# Patient Record
Sex: FEMALE | Race: BLACK OR AFRICAN AMERICAN | NOT HISPANIC OR LATINO | URBAN - METROPOLITAN AREA
[De-identification: names, ages, dates, MRNs, and addresses within clinical notes are randomized per-mention and may not be internally consistent; named-entity substitution may affect disease eponyms.]

---

## 2021-07-07 ENCOUNTER — EMERGENCY (EMERGENCY)
Facility: HOSPITAL | Age: 51
LOS: 1 days | Discharge: HOME | End: 2021-07-07
Admitting: EMERGENCY MEDICINE
Payer: MEDICAID

## 2021-07-07 VITALS
HEIGHT: 63 IN | DIASTOLIC BLOOD PRESSURE: 78 MMHG | TEMPERATURE: 98 F | OXYGEN SATURATION: 98 % | RESPIRATION RATE: 17 BRPM | SYSTOLIC BLOOD PRESSURE: 112 MMHG | WEIGHT: 169.98 LBS | HEART RATE: 60 BPM

## 2021-07-07 VITALS
DIASTOLIC BLOOD PRESSURE: 91 MMHG | OXYGEN SATURATION: 94 % | RESPIRATION RATE: 18 BRPM | TEMPERATURE: 98 F | HEART RATE: 65 BPM | SYSTOLIC BLOOD PRESSURE: 135 MMHG

## 2021-07-07 DIAGNOSIS — M62.830 MUSCLE SPASM OF BACK: ICD-10-CM

## 2021-07-07 DIAGNOSIS — K21.9 GASTRO-ESOPHAGEAL REFLUX DISEASE WITHOUT ESOPHAGITIS: ICD-10-CM

## 2021-07-07 DIAGNOSIS — I10 ESSENTIAL (PRIMARY) HYPERTENSION: ICD-10-CM

## 2021-07-07 DIAGNOSIS — M54.9 DORSALGIA, UNSPECIFIED: ICD-10-CM

## 2021-07-07 PROCEDURE — 99284 EMERGENCY DEPT VISIT MOD MDM: CPT

## 2021-07-07 RX ORDER — METHOCARBAMOL 500 MG/1
1500 TABLET, FILM COATED ORAL ONCE
Refills: 0 | Status: COMPLETED | OUTPATIENT
Start: 2021-07-07 | End: 2021-07-07

## 2021-07-07 RX ORDER — DOCUSATE SODIUM 100 MG
1 CAPSULE ORAL
Qty: 14 | Refills: 0
Start: 2021-07-07 | End: 2021-07-13

## 2021-07-07 RX ORDER — LIDOCAINE 4 G/100G
1 CREAM TOPICAL ONCE
Refills: 0 | Status: COMPLETED | OUTPATIENT
Start: 2021-07-07 | End: 2021-07-07

## 2021-07-07 RX ORDER — TRAMADOL HYDROCHLORIDE 50 MG/1
50 TABLET ORAL ONCE
Refills: 0 | Status: DISCONTINUED | OUTPATIENT
Start: 2021-07-07 | End: 2021-07-07

## 2021-07-07 RX ORDER — ACETAMINOPHEN 500 MG
650 TABLET ORAL ONCE
Refills: 0 | Status: COMPLETED | OUTPATIENT
Start: 2021-07-07 | End: 2021-07-07

## 2021-07-07 RX ORDER — DIAZEPAM 5 MG
5 TABLET ORAL ONCE
Refills: 0 | Status: DISCONTINUED | OUTPATIENT
Start: 2021-07-07 | End: 2021-07-07

## 2021-07-07 RX ORDER — KETOROLAC TROMETHAMINE 30 MG/ML
30 SYRINGE (ML) INJECTION ONCE
Refills: 0 | Status: DISCONTINUED | OUTPATIENT
Start: 2021-07-07 | End: 2021-07-07

## 2021-07-07 RX ORDER — METHOCARBAMOL 500 MG/1
2 TABLET, FILM COATED ORAL
Qty: 30 | Refills: 0
Start: 2021-07-07 | End: 2021-07-11

## 2021-07-07 RX ADMIN — Medication 5 MILLIGRAM(S): at 11:09

## 2021-07-07 RX ADMIN — METHOCARBAMOL 1500 MILLIGRAM(S): 500 TABLET, FILM COATED ORAL at 11:09

## 2021-07-07 RX ADMIN — TRAMADOL HYDROCHLORIDE 50 MILLIGRAM(S): 50 TABLET ORAL at 13:18

## 2021-07-07 RX ADMIN — LIDOCAINE 1 PATCH: 4 CREAM TOPICAL at 11:09

## 2021-07-07 RX ADMIN — Medication 650 MILLIGRAM(S): at 13:18

## 2021-07-07 RX ADMIN — Medication 30 MILLIGRAM(S): at 11:09

## 2021-07-07 NOTE — ED PROVIDER NOTE - MUSCULOSKELETAL, MLM
Spine appears normal without midline vertebral tenderness of stepoff deformity but palpable left lumbar paraspinal muscle spasm, range of motion is not limited, no muscle or joint tenderness

## 2021-07-07 NOTE — ED ADULT TRIAGE NOTE - CHIEF COMPLAINT QUOTE
pt. presents to the ED c/o lower back pain. As per pt. she has suffered from back spasms after an epidural 10 years ago which she typically takes tramadol for but it is not helping this time. Pain has been getting progressively worse over the last 4 days.

## 2021-07-07 NOTE — ED ADULT NURSE NOTE - OBJECTIVE STATEMENT
Pt is a 51y female complaining of lower back pain. Pt states that she has a history of back spasms after getting an epidural when having her child. Pt states that she normally takes tramadol for the pain. Pt states that tramadol is not working, last dose 12am. Pt denies numbness/tingling and bladder/bowel incontinence. Pt states that with pain she is unable to sit up.

## 2021-07-07 NOTE — ED PROVIDER NOTE - CONSTITUTIONAL, MLM
Well appearing, awake, alert, oriented to person, place, time/situation. appears uncomfortable. normal...

## 2021-07-07 NOTE — ED PROVIDER NOTE - OBJECTIVE STATEMENT
htn, gerd, back spasms since epidural 10 years ago - tramadol 52yo F with h/o HTN, GERD, and left back pain after epidural 10 years ago presents today with exacerbation of her recurrent left sided back spasm. pain started 3 days ago after moving some things out of her apartment. she describes it as "contractions" and "spasms." she notes it is unchanged from prior episodes. pain is worse with changing positions and with walking. she notes she has been walking slower so it has been difficult to get to the bathroom in a timely manner but she notes she has not had loss of control of bowel or bladder. she denies any numbness, tingling, weakness. denies any falls, injuries, or trauma. denies any other concerns. pt has taken tramadol for the pain, two doses yesterday, last at midnight last night without significant relief.

## 2021-07-07 NOTE — ED PROVIDER NOTE - PATIENT PORTAL LINK FT
You can access the FollowMyHealth Patient Portal offered by [facility] by registering at the following website: [email or web address]. By joining [facility]’s FollowMyHealth portal, you will also be able to view your health information using other applications (apps) compatible with our system.

## 2021-07-07 NOTE — ED PROVIDER NOTE - PROGRESS NOTE DETAILS
rechecked pt - she is ambulating at this time, feeling much improved. will send rx for robaxin, naproxen, and colace as pt has been taking tramadol and notes no BM in 2 days. rechecked pt - feeling improved but still with some low back pain. mobility improved, pt now sitting up in bed. will order tramadol and tylenol.

## 2021-07-07 NOTE — ED PROVIDER NOTE - NEUROLOGICAL, MLM
Alert and oriented, no focal deficits, no motor or sensory deficits. 5/5 strength of plantar flexion and dorsiflexion bilat. 2+ dp/pt pulses equal bilat, unable to evaluate strength of flexion at the hips 2/2 pain in her back.

## 2021-07-07 NOTE — ED PROVIDER NOTE - NSFOLLOWUPINSTRUCTIONS_ED_ALL_ED_FT
Back Pain    Back pain is very common in adults. The cause of back pain is rarely dangerous and the pain often gets better over time. The cause of your back pain may not be known and may include strain of muscles or ligaments, degeneration of the spinal disks, or arthritis. Occasionally the pain may radiate down your leg(s). Over-the-counter medicines to reduce pain and inflammation are often the most helpful. Stretching and remaining active frequently helps the healing process.     SEEK IMMEDIATE MEDICAL CARE IF YOU HAVE ANY OF THE FOLLOWING SYMPTOMS: bowel or bladder control problems, unusual weakness or numbness in your arms or legs, nausea or vomiting, abdominal pain, fever, dizziness/lightheadedness.      1. NAPROXEN 500 mg morning and night   2. ROBAXIN 750-1500 mg 1, 2 or 3 times a day for muscle spasms  3. Over the counter SALONPAS SPRAY or LIDOCAINE patches 4%  4. Get a massage (or two)  5. Try hot compresses, showers and baths  6. In 1-2 weeks, start back pain stretches from [facility]  7. In 1-2 months, start back pain strengthening exercises from [facility] Or see your regular doctor to get a referral for PHYSICAL THERAPY  8. If pain persists for 6 weeks, see your doctor for x-rays  9. IF  you get fevers or neurologic deficits - decreased sensation, weakness, tingling in your arms or legs OR  trouble urinating, weight loss or night sweats, then RETURN to the ER or see your doctor ASAP       continue taking tramadol as it was prescribed to you. DO NOT TAKE WHEN DRIVING OR OPERATING MACHINERY OR WHEN PROVIDING CARE AS IT CAN MAKE YOU SLEEPY AND IMPAIR YOUR JUDGEMENT. DO NOT TAKE WITH OTHER SEDATIVES INCLUDING ALCOHOL. IT CAN BE ADDICTIVE AND CONSTIPATING SO TAKE ONLY AS PRESCRIBED. TAKE IT WITH COLACE AS PRESCRIBED.

## 2021-07-07 NOTE — ED PROVIDER NOTE - CLINICAL SUMMARY MEDICAL DECISION MAKING FREE TEXT BOX
pt presents with exacerbation of chronic left sided back spasm. she denies any neuro component and pain is unchanged from prior. exam reveals no numbness or weakness. pt is visibly uncomfortable. will give valium, toradol, robaxin, and lidocaine patch.

## 2023-02-10 NOTE — ED ADULT NURSE NOTE - NS ED NURSE LEVEL OF CONSCIOUSNESS ORIENTATION
Refill protocol not met.    Last visit 08/08/2022. No Pending appointment .  Due around     Last refill   amLODIPine (NORVASC) 10 MG tablet 28 tablet 0 1/9/2023     carvedilol (COREG) 25 MG tablet 56 tablet 0 1/9/2023     hydrALAZINE (APRESOLINE) 100 MG tablet 56 tablet 0 1/9/2023     gabapentin (NEURONTIN) 300 MG capsule 28 capsule 0 1/9/2023     clopidogrel (PLAVIX) 75 MG tablet 28 tablet 0 1/9/2023     atorvastatin (LIPITOR) 40 MG tablet 90 tablet 3 2/16/2022           Routed to Abby Pineda DO       
Oriented - self; Oriented - place; Oriented - time

## 2024-10-01 NOTE — ED ADULT NURSE REASSESSMENT NOTE - NS ED NURSE REASSESS COMMENT FT1
I agree with the Care Coordinator's Plan of Care      Please schedule for colonoscopy and egd  Acid reflux, colon polyp  Split golytely    Needs clearance to hold plavix    Metamucil 1-2 times a day     Pt states that pain has improved to7/10. Pt sitting up in bed eating and drinking at this time.